# Patient Record
Sex: MALE | Race: WHITE | NOT HISPANIC OR LATINO | URBAN - METROPOLITAN AREA
[De-identification: names, ages, dates, MRNs, and addresses within clinical notes are randomized per-mention and may not be internally consistent; named-entity substitution may affect disease eponyms.]

---

## 2018-01-23 ENCOUNTER — OUTPATIENT (OUTPATIENT)
Dept: OUTPATIENT SERVICES | Facility: HOSPITAL | Age: 43
LOS: 1 days | Discharge: HOME | End: 2018-01-23

## 2018-01-23 DIAGNOSIS — R60.9 EDEMA, UNSPECIFIED: ICD-10-CM

## 2018-01-23 DIAGNOSIS — E55.9 VITAMIN D DEFICIENCY, UNSPECIFIED: ICD-10-CM

## 2018-01-23 DIAGNOSIS — R03.0 ELEVATED BLOOD-PRESSURE READING, WITHOUT DIAGNOSIS OF HYPERTENSION: ICD-10-CM

## 2018-02-09 ENCOUNTER — OUTPATIENT (OUTPATIENT)
Dept: OUTPATIENT SERVICES | Facility: HOSPITAL | Age: 43
LOS: 1 days | Discharge: HOME | End: 2018-02-09

## 2018-02-09 DIAGNOSIS — R60.0 LOCALIZED EDEMA: ICD-10-CM

## 2019-03-13 ENCOUNTER — EMERGENCY (EMERGENCY)
Facility: HOSPITAL | Age: 44
LOS: 0 days | Discharge: HOME | End: 2019-03-13
Attending: EMERGENCY MEDICINE | Admitting: EMERGENCY MEDICINE

## 2019-03-13 VITALS
TEMPERATURE: 100 F | DIASTOLIC BLOOD PRESSURE: 86 MMHG | SYSTOLIC BLOOD PRESSURE: 137 MMHG | HEART RATE: 103 BPM | RESPIRATION RATE: 18 BRPM | OXYGEN SATURATION: 96 %

## 2019-03-13 DIAGNOSIS — B34.9 VIRAL INFECTION, UNSPECIFIED: ICD-10-CM

## 2019-03-13 DIAGNOSIS — M79.10 MYALGIA, UNSPECIFIED SITE: ICD-10-CM

## 2019-03-13 DIAGNOSIS — R74.0 NONSPECIFIC ELEVATION OF LEVELS OF TRANSAMINASE AND LACTIC ACID DEHYDROGENASE [LDH]: ICD-10-CM

## 2019-03-13 DIAGNOSIS — J06.9 ACUTE UPPER RESPIRATORY INFECTION, UNSPECIFIED: ICD-10-CM

## 2019-03-13 LAB
ALBUMIN SERPL ELPH-MCNC: 4.4 G/DL — SIGNIFICANT CHANGE UP (ref 3.5–5.2)
ALP SERPL-CCNC: 67 U/L — SIGNIFICANT CHANGE UP (ref 30–115)
ALT FLD-CCNC: 94 U/L — HIGH (ref 0–41)
ANION GAP SERPL CALC-SCNC: 14 MMOL/L — SIGNIFICANT CHANGE UP (ref 7–14)
APPEARANCE UR: CLEAR — SIGNIFICANT CHANGE UP
AST SERPL-CCNC: 43 U/L — HIGH (ref 0–41)
BASOPHILS # BLD AUTO: 0.04 K/UL — SIGNIFICANT CHANGE UP (ref 0–0.2)
BASOPHILS NFR BLD AUTO: 0.3 % — SIGNIFICANT CHANGE UP (ref 0–1)
BILIRUB DIRECT SERPL-MCNC: 0.2 MG/DL — SIGNIFICANT CHANGE UP (ref 0–0.2)
BILIRUB INDIRECT FLD-MCNC: 1.1 MG/DL — SIGNIFICANT CHANGE UP (ref 0.2–1.2)
BILIRUB SERPL-MCNC: 1.3 MG/DL — HIGH (ref 0.2–1.2)
BILIRUB UR-MCNC: NEGATIVE — SIGNIFICANT CHANGE UP
BUN SERPL-MCNC: 12 MG/DL — SIGNIFICANT CHANGE UP (ref 10–20)
CALCIUM SERPL-MCNC: 9.2 MG/DL — SIGNIFICANT CHANGE UP (ref 8.5–10.1)
CHLORIDE SERPL-SCNC: 98 MMOL/L — SIGNIFICANT CHANGE UP (ref 98–110)
CO2 SERPL-SCNC: 24 MMOL/L — SIGNIFICANT CHANGE UP (ref 17–32)
COLOR SPEC: YELLOW — SIGNIFICANT CHANGE UP
CREAT SERPL-MCNC: 1 MG/DL — SIGNIFICANT CHANGE UP (ref 0.7–1.5)
DIFF PNL FLD: ABNORMAL
EOSINOPHIL # BLD AUTO: 0.05 K/UL — SIGNIFICANT CHANGE UP (ref 0–0.7)
EOSINOPHIL NFR BLD AUTO: 0.4 % — SIGNIFICANT CHANGE UP (ref 0–8)
EPI CELLS # UR: ABNORMAL /HPF
FLU A RESULT: NEGATIVE — SIGNIFICANT CHANGE UP
FLU A RESULT: NEGATIVE — SIGNIFICANT CHANGE UP
FLUAV AG NPH QL: NEGATIVE — SIGNIFICANT CHANGE UP
FLUBV AG NPH QL: NEGATIVE — SIGNIFICANT CHANGE UP
GLUCOSE SERPL-MCNC: 198 MG/DL — HIGH (ref 70–99)
GLUCOSE UR QL: NEGATIVE MG/DL — SIGNIFICANT CHANGE UP
HCT VFR BLD CALC: 43.7 % — SIGNIFICANT CHANGE UP (ref 42–52)
HGB BLD-MCNC: 15.8 G/DL — SIGNIFICANT CHANGE UP (ref 14–18)
IMM GRANULOCYTES NFR BLD AUTO: 0.4 % — HIGH (ref 0.1–0.3)
KETONES UR-MCNC: NEGATIVE — SIGNIFICANT CHANGE UP
LACTATE SERPL-SCNC: 1.8 MMOL/L — SIGNIFICANT CHANGE UP (ref 0.5–2.2)
LEUKOCYTE ESTERASE UR-ACNC: NEGATIVE — SIGNIFICANT CHANGE UP
LIDOCAIN IGE QN: 33 U/L — SIGNIFICANT CHANGE UP (ref 7–60)
LYMPHOCYTES # BLD AUTO: 1.07 K/UL — LOW (ref 1.2–3.4)
LYMPHOCYTES # BLD AUTO: 7.8 % — LOW (ref 20.5–51.1)
MCHC RBC-ENTMCNC: 31.4 PG — HIGH (ref 27–31)
MCHC RBC-ENTMCNC: 36.2 G/DL — SIGNIFICANT CHANGE UP (ref 32–37)
MCV RBC AUTO: 86.9 FL — SIGNIFICANT CHANGE UP (ref 80–94)
MONOCYTES # BLD AUTO: 0.82 K/UL — HIGH (ref 0.1–0.6)
MONOCYTES NFR BLD AUTO: 6 % — SIGNIFICANT CHANGE UP (ref 1.7–9.3)
NEUTROPHILS # BLD AUTO: 11.64 K/UL — HIGH (ref 1.4–6.5)
NEUTROPHILS NFR BLD AUTO: 85.1 % — HIGH (ref 42.2–75.2)
NITRITE UR-MCNC: NEGATIVE — SIGNIFICANT CHANGE UP
NRBC # BLD: 0 /100 WBCS — SIGNIFICANT CHANGE UP (ref 0–0)
PH UR: 6 — SIGNIFICANT CHANGE UP (ref 5–8)
PLATELET # BLD AUTO: 211 K/UL — SIGNIFICANT CHANGE UP (ref 130–400)
POTASSIUM SERPL-MCNC: 4.4 MMOL/L — SIGNIFICANT CHANGE UP (ref 3.5–5)
POTASSIUM SERPL-SCNC: 4.4 MMOL/L — SIGNIFICANT CHANGE UP (ref 3.5–5)
PROT SERPL-MCNC: 7.1 G/DL — SIGNIFICANT CHANGE UP (ref 6–8)
PROT UR-MCNC: NEGATIVE MG/DL — SIGNIFICANT CHANGE UP
RBC # BLD: 5.03 M/UL — SIGNIFICANT CHANGE UP (ref 4.7–6.1)
RBC # FLD: 11.6 % — SIGNIFICANT CHANGE UP (ref 11.5–14.5)
RBC CASTS # UR COMP ASSIST: ABNORMAL /HPF
RSV RESULT: NEGATIVE — SIGNIFICANT CHANGE UP
RSV RNA RESP QL NAA+PROBE: NEGATIVE — SIGNIFICANT CHANGE UP
SODIUM SERPL-SCNC: 136 MMOL/L — SIGNIFICANT CHANGE UP (ref 135–146)
SP GR SPEC: 1.01 — SIGNIFICANT CHANGE UP (ref 1.01–1.03)
TROPONIN T SERPL-MCNC: <0.01 NG/ML — SIGNIFICANT CHANGE UP
UROBILINOGEN FLD QL: 0.2 MG/DL — SIGNIFICANT CHANGE UP (ref 0.2–0.2)
WBC # BLD: 13.68 K/UL — HIGH (ref 4.8–10.8)
WBC # FLD AUTO: 13.68 K/UL — HIGH (ref 4.8–10.8)
WBC UR QL: SIGNIFICANT CHANGE UP /HPF

## 2019-03-13 RX ORDER — SODIUM CHLORIDE 9 MG/ML
2000 INJECTION, SOLUTION INTRAVENOUS
Qty: 0 | Refills: 0 | Status: DISCONTINUED | OUTPATIENT
Start: 2019-03-13 | End: 2019-03-13

## 2019-03-13 RX ORDER — KETOROLAC TROMETHAMINE 30 MG/ML
30 SYRINGE (ML) INJECTION ONCE
Qty: 0 | Refills: 0 | Status: DISCONTINUED | OUTPATIENT
Start: 2019-03-13 | End: 2019-03-13

## 2019-03-13 RX ADMIN — Medication 30 MILLIGRAM(S): at 11:06

## 2019-03-13 RX ADMIN — SODIUM CHLORIDE 2000 MILLILITER(S): 9 INJECTION, SOLUTION INTRAVENOUS at 13:03

## 2019-03-13 RX ADMIN — Medication 30 MILLIGRAM(S): at 13:03

## 2019-03-13 RX ADMIN — SODIUM CHLORIDE 1000 MILLILITER(S): 9 INJECTION, SOLUTION INTRAVENOUS at 11:06

## 2019-03-13 NOTE — ED PROVIDER NOTE - CLINICAL SUMMARY MEDICAL DECISION MAKING FREE TEXT BOX
43yM p/w subjective fever/chills, myalgias, cough.  Vitals with mild tachycardia, improved with IV LR and toradol.  Pt well appearing. EKG nonischemic. CXR w/o PNA. Labs w/ mild leukocytosis, normal electrolytes, mild transaminitis, likely from nonspecific viral illness. D/w pt likely etiologies of his LFTs - no concern for needlestick exposure. Recommend supportive care for likely viral URI, f/u PCP for monitoring of his LFTs and further testing as needed.  Pt feeling better, now well hydrated, ok to dc home.

## 2019-03-13 NOTE — ED PROVIDER NOTE - OBJECTIVE STATEMENT
Pt is a 44y/o male with no sig pmhx presents today for eval of diffuse body aches, and productive cough with green sputum which has been constant  x 3 days. Pt sts there are no aggravating/alleviating factors. Pt denies abd pain, n/v/d, CP, SOB.

## 2019-03-13 NOTE — ED PROVIDER NOTE - CARE PLAN
Principal Discharge DX:	Viral syndrome  Secondary Diagnosis:	Cough Principal Discharge DX:	URI (upper respiratory infection)  Secondary Diagnosis:	Cough  Secondary Diagnosis:	Myalgia  Secondary Diagnosis:	Transaminitis

## 2019-03-13 NOTE — ED PROVIDER NOTE - NS ED ROS FT
Eyes:  No visual changes, eye pain or discharge.  ENMT:  No hearing changes, pain, discharge or infections. No neck pain or stiffness.  Cardiac:  No chest pain, SOB or edema. No chest pain with exertion.  Respiratory: + cough No respiratory distress. No hemoptysis. No history of asthma or RAD.  GI:  No nausea, vomiting, diarrhea or abdominal pain.  :  No dysuria, frequency or burning.  MS:  + myalgia No muscle weakness, joint pain or back pain.  Neuro:  No headache or weakness.  No LOC.  Skin:  No skin rash.   Endocrine: No history of thyroid disease or diabetes.  Except as documented in the HPI,  all other systems are negative.

## 2019-03-13 NOTE — ED PROVIDER NOTE - PHYSICAL EXAMINATION
VITAL SIGNS: I have reviewed nursing notes and confirm.  CONSTITUTIONAL: Well-developed; well-nourished; in no acute distress.   SKIN: skin exam is warm and dry, no acute rash.    HEAD: Normocephalic; atraumatic.  EYES:  conjunctiva and sclera clear.  ENT: No nasal discharge; airway clear.  NECK: Supple; non tender.  CARD: S1, S2 normal; no murmurs, gallops, or rubs. Regular rate and rhythm.   RESP: No wheezes, rales or rhonchi.  ABD: Normal bowel sounds; soft; non-distended; non-tender  EXT: Normal ROM.  No clubbing, cyanosis or edema.   LYMPH: No acute cervical adenopathy.  NEURO: Alert, oriented, grossly unremarkable

## 2019-03-13 NOTE — ED PROVIDER NOTE - ATTENDING CONTRIBUTION TO CARE
43yM otherwise healthy p/w 3d of myalgias, subjective fever/chills, cough prod of green sputum.  No SOB.  Tried to go to work today and felt too weak, which is why he came to the ED.    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  NECK: supple; non tender. + full passive ROM in all directions  CARD: S1, S2 normal; no murmurs, gallops, or rubs. Regular rate and rhythm  RESP: no wheezes, rales or rhonchi. Good air movement bilaterally without significant accessory muscle use  ABD: soft; non-distended; non-tender. No rebound, no guarding, no pulsatile abdominal mass  EXT: moving all extremities spontaneously, normal ROM. No clubbing, cyanosis or edema  SKIN: warm and dry, no lesions noted  NEURO: alert, oriented, CN II-XII grossly intact, motor and sensory grossly intact, speech nonslurred, no focal deficits. GCS 15  PSYCH: calm, cooperative, appropriate, good eye contact, logical thought process, no apparent danger to self or others    cxr  labs  reassess

## 2019-03-14 LAB
CULTURE RESULTS: SIGNIFICANT CHANGE UP
SPECIMEN SOURCE: SIGNIFICANT CHANGE UP

## 2019-11-01 ENCOUNTER — OUTPATIENT (OUTPATIENT)
Dept: OUTPATIENT SERVICES | Facility: HOSPITAL | Age: 44
LOS: 1 days | Discharge: HOME | End: 2019-11-01

## 2019-11-01 DIAGNOSIS — Z00.00 ENCOUNTER FOR GENERAL ADULT MEDICAL EXAMINATION WITHOUT ABNORMAL FINDINGS: ICD-10-CM

## 2019-11-01 DIAGNOSIS — D51.0 VITAMIN B12 DEFICIENCY ANEMIA DUE TO INTRINSIC FACTOR DEFICIENCY: ICD-10-CM

## 2020-04-26 ENCOUNTER — MESSAGE (OUTPATIENT)
Age: 45
End: 2020-04-26

## 2020-05-06 ENCOUNTER — APPOINTMENT (OUTPATIENT)
Dept: DISASTER EMERGENCY | Facility: HOSPITAL | Age: 45
End: 2020-05-06

## 2020-05-07 LAB
SARS-COV-2 IGG SERPL IA-ACNC: 0.1 INDEX
SARS-COV-2 IGG SERPL QL IA: NEGATIVE

## 2022-05-04 ENCOUNTER — NON-APPOINTMENT (OUTPATIENT)
Age: 47
End: 2022-05-04

## 2022-11-10 ENCOUNTER — EMERGENCY (EMERGENCY)
Facility: HOSPITAL | Age: 47
LOS: 0 days | Discharge: HOME | End: 2022-11-10
Attending: EMERGENCY MEDICINE | Admitting: EMERGENCY MEDICINE

## 2022-11-10 VITALS
TEMPERATURE: 98 F | DIASTOLIC BLOOD PRESSURE: 84 MMHG | RESPIRATION RATE: 18 BRPM | OXYGEN SATURATION: 97 % | HEART RATE: 96 BPM | SYSTOLIC BLOOD PRESSURE: 139 MMHG
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
## 2022-11-10 DIAGNOSIS — T17.928A FOOD IN RESPIRATORY TRACT, PART UNSPECIFIED CAUSING OTHER INJURY, INITIAL ENCOUNTER: ICD-10-CM
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
## 2022-11-10 PROCEDURE — 99284 EMERGENCY DEPT VISIT MOD MDM: CPT

## 2022-11-10 PROCEDURE — 71045 X-RAY EXAM CHEST 1 VIEW: CPT | Mod: 26

## 2022-11-10 PROCEDURE — 93010 ELECTROCARDIOGRAM REPORT: CPT

## 2022-11-10 NOTE — ED ADULT NURSE NOTE - MODE OF DISCHARGE
Will get a TSH today  Will follow up with her when results are available  She does appear euthyroid  She did lose 6 lb since her last visit  Ambulatory

## 2022-11-10 NOTE — ED PROVIDER NOTE - ATTENDING CONTRIBUTION TO CARE
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 Having lunch on shift and had a chocking episode on a piece of chicken. Improved and was able to swallow but still had sensation of tightness so to ED for eval.   No dyspnea.    AVSS, exam as noted, CTAB, RRR, abdomen soft NTND, (+) bowel sounds, neuro nonfocal

## 2022-11-10 NOTE — ED PROVIDER NOTE - PATIENT PORTAL LINK FT
You can access the FollowMyHealth Patient Portal offered by [facility] by registering at the following website: [email or web address]. By joining [employer]’s FollowMyHealth portal, you will also be able to view your health information using other applications (apps) compatible with our system.

## 2022-11-10 NOTE — ED PROVIDER NOTE - OBJECTIVE STATEMENT
47 year old male with no past medical history presenting to the ED for choking episode. While pt was eating lunch he started coughing while eating a piece of chicken. Does not feel like he choked on the bones but on the chicken itself. Endorses sensation of chest tightness following the episode. Currently not having difficulty breathing.

## 2022-11-10 NOTE — ED PROVIDER NOTE - CARE PROVIDERS DIRECT ADDRESSES
[email or web address] [email or web address]

## 2022-11-10 NOTE — ED PROVIDER NOTE - PROVIDER TOKENS
PROVIDER:[TOKEN:[[id number]],FOLLOWUP:[1-3 Days]],PROVIDER:[TOKEN:[[id number]],FOLLOWUP:[Routine]]

## 2022-11-10 NOTE — ED PROVIDER NOTE - PHYSICAL EXAMINATION
CONSTITUTIONAL: Well-developed; well-nourished; in no acute distress.   SKIN: warm, dry. no rashes.  HEAD: Normocephalic; atraumatic.  EYES: PERRLA, EOMI, no conjunctival erythema.  ENT: No nasal discharge; airway clear. MMM.  NECK: Supple; non tender.  CARD: S1, S2 normal; no murmurs, gallops, or rubs. Regular rate and rhythm.   RESP: No wheezes, rales or rhonchi. lungs ctab.  ABD: soft ntnd; no masses, rebound, or guarding.  EXT: Normal ROM.  No clubbing, cyanosis or edema.  NEURO: Alert, oriented, grossly unremarkable. no masses, rebound, or guarding. no focal neuro. deficits.  PSYCH: Cooperative, appropriate.

## 2022-11-10 NOTE — ED PROVIDER NOTE - CARE PROVIDER_API CALL
Luis Davis)  Otolaryngology  [address], [address]nd [place] [address]  Phone: [phone]  Fax: [phone]  Follow Up Time: 1-3 Days    Antonino Giron)  Internal Medicine  [facility] [place] [address]  Phone: [phone]  Fax: [phone]  Follow Up Time: Routine

## 2022-11-10 NOTE — ED ADULT TRIAGE NOTE - CHIEF COMPLAINT QUOTE
pt started coughing while eating lunch. no LOC or cyanosis. no Heimlich maneuver needed. pt c/o chest tightness

## 2023-04-19 ENCOUNTER — NON-APPOINTMENT (OUTPATIENT)
Age: 48
End: 2023-04-19

## 2024-05-13 NOTE — ED PROVIDER NOTE - NSFOLLOWUPINSTRUCTIONS_ED_ALL_ED_FT
Choking, Adult    Choking occurs when a food or object gets stuck in the throat or windpipe (trachea) and blocks the airway. If the airway is partly blocked, coughing will usually cause the food or object to come out. If the airway is completely blocked, immediate action is needed to make it come out. The kind of treatment you offer depends on the severity of the choking.    Signs of choking  A person has a complete airway blockage if he or she:  Is unable to breathe.  Is making soft or high-pitched sounds while breathing.  Is unable to cough or is coughing weakly, ineffectively, or silently.  Is unable to cry, speak, or make sounds.  Is turning blue or grey.  Is holding his or her neck with both hands. This is considered a universal sign of choking.  Nods "yes" when asked if he or she is choking.    Follow these instructions at home:  For partial airway blockage     If a person has a partial airway blockage and he or she is coughing and is able to speak:  Do not interfere. Allow coughing to clear the airway.  Do not let him or her try to drink until the food or object comes out.  Stay with the person until the food or object comes out. Watch for signs of choking (complete airway blockage). If the person shows signs of complete airway blockage, you should take action to help the person.    For complete airway blockage    If a person has a complete airway blockage, his or her life is in danger. Choking causes breathing to stop. This is a medical emergency that requires fast, appropriate action by anyone who is available.    To save a person who is choking:  Encourage the person to cough. If this does not relieve the blockage, go to Step 2.  Perform a Heimlich maneuver. The Heimlich maneuver, also called abdominal thrusts, uses pressure to force air from the abdomen into the throat to move a blockage.    Heimlich maneuver for choking    For a person who is sitting or standing:  Ask the person whether he or she is choking. If the person nods or gives the universal sign of choking, continue to step 2.  Shout for help. If someone responds, tell that person to call local emergency services (911 in the [place]  Stand or kneel behind the person who is choking and lean him or her forward slightly.  Make a fist with one hand, put your arms around the person's midsection, and grasp your fist with your other hand. Place the thumb side of your fist against the person's abdomen, just below the ribs and above the belly button area.  Quickly and firmly press inward (toward you) and upward with both hands. Repeat this 5 times as necessary.  If the person is pregnant or obese and you cannot wrap your arms around the person's midsection, wrap your arms around the chest (under the armpits), place your hands over the breastbone, and do chest thrusts.    Repeat this maneuver until the object comes out and the person is able to breathe, or until the person becomes unconscious.  Anyone who has been given the Heimlich maneuver should be seen by a health care provider after the event.  For a person who is unconscious (if the choking person collapses or is found on the ground and you know the person was choking):  Shout for help.    If someone responds, tell that person to call local emergency services (805 in [place] and look for an AED.  If no one responds, call local emergency services (832 in the [place] yourself, if possible.  Make sure the person is lying on a firm, flat surface, facing up. Begin CPR, starting with 30 compressions and 2 breaths. Every time you open the airway to give rescue breaths, open the person's mouth. If you can see the food or object and it can be easily pulled out, remove it with your fingers. Remember:  Do not stick your fingers into the victim's throat as this may cause the food or object to go in farther.  Do not try to remove the food or object if you cannot see it. Blind finger sweeps can push it farther into the airway.  After 5 cycles or 2 minutes of CPR, call local emergency services (786 in [place] if a call has not already been made.  Continue CPR until the person starts breathing or until help arrives.    If you are choking:  Call local emergency services (017 in [place] Do not worry about communicating what is happening. Do not hang up the phone. Someone may be sent to help you anyway.  Make a fist with one hand. Put the thumb side of the fist against your abdomen, just above the belly button and well below the breastbone. If you are pregnant or obese, put your fist on your chest instead, just below the breastbone and just above your lowest ribs.  Hold your fist with your other hand and bend over a hard surface, such as a table or chair.  Forcefully push your fist in and up.  Continue to do this until the food or object comes out.  Prevention    Chew food thoroughly.  Avoid talking while you are chewing and swallowing.  To be prepared if choking occurs, take a certified first-aid course to learn how to correctly perform the Heimlich maneuver.    Contact a health care provider if:  You have trouble swallowing food.    Get help right away if:  You have problems breathing after choking stops.  You were given the Heimlich maneuver.    Summary  Choking occurs when a food or object gets stuck in the throat (trachea) and blocks the airway. If a person has a partial airway blockage and is able to talk and cough, do not interfere and do not allow the person to drink until the food or object comes out.   If a person has a complete airway blockage, perform the Heimlich maneuver. Call local emergency services and take the person to a health care provider afterward.  If the person is unconscious, call local emergency services and perform CPR until the person breathes normally or until help arrives. BACK PAIN